# Patient Record
Sex: FEMALE | Employment: OTHER | URBAN - METROPOLITAN AREA
[De-identification: names, ages, dates, MRNs, and addresses within clinical notes are randomized per-mention and may not be internally consistent; named-entity substitution may affect disease eponyms.]

---

## 2024-08-06 ENCOUNTER — HOSPITAL ENCOUNTER (OUTPATIENT)
Dept: SPEECH THERAPY | Age: 79
Setting detail: THERAPIES SERIES
Discharge: HOME | End: 2024-08-06
Payer: MEDICARE

## 2024-08-06 DIAGNOSIS — R49.0 DYSPHONIA: Primary | ICD-10-CM

## 2024-08-06 PROCEDURE — 92524 BEHAVRAL QUALIT ANALYS VOICE: CPT

## 2024-08-06 NOTE — PLAN OF CARE
dysphonia. This included training the use of SOVT, Resonant Voice (RVT) exercises and Kazoo exercises to help promote forward resonance. The pt denied any yelling but did admit to only drinking 32 to 40 oz of water a day. A plan to slowly increase her water intake was discussed and implemented. Pt denies concerns with swallowing or speech, language, and cognition at this time. Pt would benefit from skilled speech intervention in order to return to baseline and compensate for decreased functional abilities, with consideration for further voice assessment via videostroboscopy as clinically indicated. The pt requested that therapy only be once per month at this time due to her co-pay and other engagements. See the rest of this report for more details.    Treatment Diagnosis:  Voice  [x]Dysphonia   []Mild   [x] Moderate  []Severe    []Aphonia   []Mild   [] Moderate  []Severe    []Hypophonia   []Mild   [] Moderate  []Severe    []Other:   []Mild   [] Moderate  []Severe        Prognosis/Rehab Potential: Good      Tolerance of evaluation/treatment: Good    Medical Necessity Documentation:  I certify that this patient meets the below criteria necessary for medical necessity for care and/or justification of therapy services:  The patient has functional impairments and/or activity limitations and would benefit from continued outpatient therapy services to address the deficits outlined in the patients goals    TREATMENT PLAN     Frequency/Duration: 1-2x/Month for 3 months for the following treatment interventions:    Therapeutic Interventions:  []  Speech-Language Evaluation/Treatment  []  Cognitive-Linguistic Skills Development  [x]  Voice Evaluation and Treatment  []  Ru Jasmin Voice Treatment ([facility])  []  Dysphagia Evaluation/Treatment  []  Modified Barium Swallow Study  []  Fiberoptic Endoscopic Evaluation of Swallowing (FEES)  [x]  Videostroboscopy (VLS) if later indicated  []  Dysphagia Treatment via Neuromuscular

## 2024-09-03 ENCOUNTER — HOSPITAL ENCOUNTER (OUTPATIENT)
Dept: SPEECH THERAPY | Age: 79
Setting detail: THERAPIES SERIES
Discharge: HOME | End: 2024-09-03
Payer: MEDICARE

## 2024-09-03 PROCEDURE — 92507 TX SP LANG VOICE COMM INDIV: CPT

## 2024-09-03 NOTE — PLAN OF CARE
note on the sound \"O\": x 2 reps  Prolongation of \"O\" on the musical note C: x 2 reps  Prolongation of \"O\" on the musical note D: x 2 reps  Prolongation of \"O\" on the musical note E: x 2 reps  Prolongation of \"O\" on the musical note F: x 2 reps  Prolongation of \"O\" on the musical note G: x 2 reps      Easy Onset      Cup Phonation Targeted with vowel prolongations, single words and with sentences.     Respiratory Interventions:      Respiratory Coordination      Respiratory Support   Treatment focused on improving respiratory support for phonation through implementation of diaphragmatic breathing, postural adjustments, training respiratory/phonatory efficiency, and respiratory muscle strength training (Joce et al., 2014)   Musculoskeletal Interventions:      Stretching      Postural Modification             Pt. Education:  -Pt educated on diagnosis, prognosis and expectations for rehab  -All pt questions were answered  -Pt verbalized understanding and agreement    HEP instruction:  Pt provided with written and illustrated instructions for HEP:   - RVT, Cup phonation and vocal function exercises    ASSESSMENT   The pt was seen today for the first time since her initial evaluation on 8/6/24 due to the pt electing to only been seen 1 x per month. Her dysphonia persists but was overall less dysphonic compared to her initial evaluation. Mildly hoarse vocal quality continues with noted frequent pitch breaks during voice exercises. She appeared to not understand the RVT exercises. RVT and cup phonation was used at length to help promote forward resonance. Although the pt still struggled to consistently produce this, her voice quality greatly improved after completing these exercises. Vocal function exercise were also initiated today. Continued voice therapy is recommended at this time.     Prognosis/Rehab Potential: Good                   Tolerance of evaluation/treatment: Patient able to complete tx    Patient Requires